# Patient Record
Sex: FEMALE | Race: OTHER
[De-identification: names, ages, dates, MRNs, and addresses within clinical notes are randomized per-mention and may not be internally consistent; named-entity substitution may affect disease eponyms.]

---

## 2020-08-13 ENCOUNTER — HOSPITAL ENCOUNTER (EMERGENCY)
Dept: HOSPITAL 72 - EMR | Age: 30
Discharge: HOME | End: 2020-08-13
Payer: MEDICAID

## 2020-08-13 VITALS — BODY MASS INDEX: 29.27 KG/M2 | WEIGHT: 155 LBS | HEIGHT: 61 IN

## 2020-08-13 VITALS — SYSTOLIC BLOOD PRESSURE: 125 MMHG | DIASTOLIC BLOOD PRESSURE: 82 MMHG

## 2020-08-13 VITALS — DIASTOLIC BLOOD PRESSURE: 82 MMHG | SYSTOLIC BLOOD PRESSURE: 125 MMHG

## 2020-08-13 VITALS — DIASTOLIC BLOOD PRESSURE: 81 MMHG | SYSTOLIC BLOOD PRESSURE: 129 MMHG

## 2020-08-13 DIAGNOSIS — O23.41: ICD-10-CM

## 2020-08-13 DIAGNOSIS — Z3A.10: ICD-10-CM

## 2020-08-13 DIAGNOSIS — O21.0: Primary | ICD-10-CM

## 2020-08-13 LAB
ADD MANUAL DIFF: NO
ALBUMIN SERPL-MCNC: 3.9 G/DL (ref 3.4–5)
ALBUMIN/GLOB SERPL: 0.9 {RATIO} (ref 1–2.7)
ALP SERPL-CCNC: 64 U/L (ref 46–116)
ALT SERPL-CCNC: 84 U/L (ref 12–78)
ANION GAP SERPL CALC-SCNC: 14 MMOL/L (ref 5–15)
APPEARANCE UR: (no result)
APTT PPP: YELLOW S
AST SERPL-CCNC: 51 U/L (ref 15–37)
BASOPHILS NFR BLD AUTO: 0.4 % (ref 0–2)
BILIRUB SERPL-MCNC: 0.9 MG/DL (ref 0.2–1)
BUN SERPL-MCNC: 10 MG/DL (ref 7–18)
CALCIUM SERPL-MCNC: 9.5 MG/DL (ref 8.5–10.1)
CHLORIDE SERPL-SCNC: 100 MMOL/L (ref 98–107)
CO2 SERPL-SCNC: 23 MMOL/L (ref 21–32)
CREAT SERPL-MCNC: 0.7 MG/DL (ref 0.55–1.3)
EOSINOPHIL NFR BLD AUTO: 0 % (ref 0–3)
ERYTHROCYTE [DISTWIDTH] IN BLOOD BY AUTOMATED COUNT: 11.4 % (ref 11.6–14.8)
GLOBULIN SER-MCNC: 4.2 G/DL
GLUCOSE UR STRIP-MCNC: NEGATIVE MG/DL
HCT VFR BLD CALC: 41.8 % (ref 37–47)
HGB BLD-MCNC: 14 G/DL (ref 12–16)
KETONES UR QL STRIP: (no result)
LEUKOCYTE ESTERASE UR QL STRIP: (no result)
LYMPHOCYTES NFR BLD AUTO: 24.6 % (ref 20–45)
MCV RBC AUTO: 93 FL (ref 80–99)
MONOCYTES NFR BLD AUTO: 6.9 % (ref 1–10)
NEUTROPHILS NFR BLD AUTO: 68.2 % (ref 45–75)
NITRITE UR QL STRIP: NEGATIVE
PH UR STRIP: 5 [PH] (ref 4.5–8)
PLATELET # BLD: 250 K/UL (ref 150–450)
POTASSIUM SERPL-SCNC: 3.7 MMOL/L (ref 3.5–5.1)
PROT UR QL STRIP: (no result)
RBC # BLD AUTO: 4.49 M/UL (ref 4.2–5.4)
SODIUM SERPL-SCNC: 137 MMOL/L (ref 136–145)
SP GR UR STRIP: 1.01 (ref 1–1.03)
UROBILINOGEN UR-MCNC: 1 MG/DL (ref 0–1)
WBC # BLD AUTO: 9.7 K/UL (ref 4.8–10.8)

## 2020-08-13 PROCEDURE — 84702 CHORIONIC GONADOTROPIN TEST: CPT

## 2020-08-13 PROCEDURE — 83690 ASSAY OF LIPASE: CPT

## 2020-08-13 PROCEDURE — 80053 COMPREHEN METABOLIC PANEL: CPT

## 2020-08-13 PROCEDURE — 96374 THER/PROPH/DIAG INJ IV PUSH: CPT

## 2020-08-13 PROCEDURE — 76801 OB US < 14 WKS SINGLE FETUS: CPT

## 2020-08-13 PROCEDURE — 96361 HYDRATE IV INFUSION ADD-ON: CPT

## 2020-08-13 PROCEDURE — 36415 COLL VENOUS BLD VENIPUNCTURE: CPT

## 2020-08-13 PROCEDURE — 81025 URINE PREGNANCY TEST: CPT

## 2020-08-13 PROCEDURE — 85025 COMPLETE CBC W/AUTO DIFF WBC: CPT

## 2020-08-13 PROCEDURE — 81003 URINALYSIS AUTO W/O SCOPE: CPT

## 2020-08-13 PROCEDURE — 99284 EMERGENCY DEPT VISIT MOD MDM: CPT

## 2020-08-13 NOTE — DIAGNOSTIC IMAGING REPORT
Indication: Abdominal pain, pregnant patient, positive hCG

 

Technique: Transabdominal only images of the pelvis. Transvaginal images not

performed, per patient preference

 

Comparison: none

 

Findings: Lack of transvaginal imaging limits exam. Uterus measures 8.5 cm in length

by 6.8 cm AP. Within the endometrium, there is a gestational sac. This demonstrates a

fetal pole with a crown-rump length of 3.4 cm, corresponding to an estimated

gestational age of 10 weeks one day. There is positive fetal heart activity, fetal

heart rate 160 bpm. The cervix is closed. No subchorionic hemorrhage. Neither ovary

could be identified. No free cul-de-sac fluid

 

Impression: Limited exam, due to lack of transvaginal images.

 

10 week 1 day, by crown-rump length measurement, single live intrauterine pregnancy.

No unusual features

 

Note nonvisualization of the ovaries

## 2020-08-13 NOTE — EMERGENCY ROOM REPORT
History of Present Illness


General


Chief Complaint:  Nausea


Source:  Patient





Present Illness


HPI


30-year-old female G2, P1, presents with nausea vomiting currently in her first 

trimester no abdominal pain, she states she feels very nauseous has been taking 

Reglan daily, aggravated by her pregnancy, no alleviating factors, severity is 

moderate, ongoing x 5 weeks. No f/c, dysuria, abdominal pain, diarrhea, cp or 

sob


Allergies:  


Coded Allergies:  


     No Known Allergies (Unverified , 20)





COVID-19 Screening


Contact w/high risk pt:  No


Experienced COVID-19 symptoms?:  No


COVID-19 Testing performed PTA:  No





Patient History


Past Medical History:  see triage record


Pregnant Now:  Yes - 11 weeks


:  2


Para:  1


Reviewed Nursing Documentation:  PMH: Agreed; PSxH: Agreed





Nursing Documentation-PMH


Past Medical History:  No Stated History





Review of Systems


All Other Systems:  negative except mentioned in HPI





Physical Exam





Vital Signs








  Date Time  Temp Pulse Resp B/P (MAP) Pulse Ox O2 Delivery O2 Flow Rate FiO2


 


20 10:24 98.2 102 18 129/81 (97) 97 Room Air  








Sp02 EP Interpretation:  reviewed, normal


General Appearance:  well appearing, no apparent distress, alert


Head:  normocephalic, atraumatic


Eyes:  bilateral eye PERRL, bilateral eye EOMI


ENT:  uvula midline, dry mucus membranes


Neck:  supple, thyroid normal, supple/symm/no masses


Respiratory:  lungs clear, no respiratory distress, no retraction, no accessory 

muscle use


Cardiovascular #1:  normal peripheral pulses, regular rate, rhythm, no edema, 

no gallop, no murmur


Gastrointestinal:  non tender, soft, no guarding, no rebound


Musculoskeletal:  normal inspection


Neurologic:  alert, oriented x3


Psychiatric:  mood/affect normal


Skin:  no rash, warm/dry





Medical Decision Making


Diagnostic Impression:  


 Primary Impression:  


 Hyperemesis gravidarum


 Additional Impression:  


 UTI (urinary tract infection)


 Qualified Codes:  N30.00 - Acute cystitis without hematuria


ER Course


30-year-old female G2, P1, 10 weeks of pregnancy presents with acute nausea and 

vomiting, most consistent with hyperemesis gravidarum, patient with ketones in 

the urine, patient with possible bacteria, will start her on Keflex and, 

patient felt better with fluid rehydration 1 L, patient is tolerating p.o.


Disposition home with return precautions


Abdomen remains soft nontender





Laboratory Tests








Test


  8/13/20


10:40


 


White Blood Count


  9.7 K/UL


(4.8-10.8)


 


Red Blood Count


  4.49 M/UL


(4.20-5.40)


 


Hemoglobin


  14.0 G/DL


(12.0-16.0)


 


Hematocrit


  41.8 %


(37.0-47.0)


 


Mean Corpuscular Volume 93 FL (80-99)  


 


Mean Corpuscular Hemoglobin


  31.2 PG


(27.0-31.0)  H


 


Mean Corpuscular Hemoglobin


Concent 33.5 G/DL


(32.0-36.0)


 


Red Cell Distribution Width


  11.4 %


(11.6-14.8)  L


 


Platelet Count


  250 K/UL


(150-450)


 


Mean Platelet Volume


  6.4 FL


(6.5-10.1)  L


 


Neutrophils (%) (Auto)


  68.2 %


(45.0-75.0)


 


Lymphocytes (%) (Auto)


  24.6 %


(20.0-45.0)


 


Monocytes (%) (Auto)


  6.9 %


(1.0-10.0)


 


Eosinophils (%) (Auto)


  0.0 %


(0.0-3.0)


 


Basophils (%) (Auto)


  0.4 %


(0.0-2.0)


 


Urine Color Yellow  


 


Urine Appearance Cloudy  


 


Urine pH 5 (4.5-8.0)  


 


Urine Specific Gravity


  1.015


(1.005-1.035)


 


Urine Protein


  1+ (NEGATIVE)


H


 


Urine Glucose (UA)


  Negative


(NEGATIVE)


 


Urine Ketones


  4+ (NEGATIVE)


H


 


Urine Blood


  4+ (NEGATIVE)


H


 


Urine Nitrite


  Negative


(NEGATIVE)


 


Urine Bilirubin


  Negative


(NEGATIVE)


 


Urine Urobilinogen


  1 MG/DL


(0.0-1.0)  H


 


Urine Leukocyte Esterase


  1+ (NEGATIVE)


H


 


Urine RBC


  2-4 /HPF (0 -


2)  H


 


Urine WBC


  2-4 /HPF (0 -


2)


 


Urine Squamous Epithelial


Cells Moderate /LPF


(NONE/OCC)  H


 


Urine Bacteria


  Few /HPF


(NONE)


 


Urine HCG, Qualitative


  Positive


(NEGATIVE)


 


Sodium Level


  137 MMOL/L


(136-145)


 


Potassium Level


  3.7 MMOL/L


(3.5-5.1)


 


Chloride Level


  100 MMOL/L


()


 


Carbon Dioxide Level


  23 MMOL/L


(21-32)


 


Anion Gap


  14 mmol/L


(5-15)


 


Blood Urea Nitrogen


  10 mg/dL


(7-18)


 


Creatinine


  0.7 MG/DL


(0.55-1.30)


 


Estimated Glomerular


Filtration Rate > 60 mL/min


(>60)


 


Glucose Level


  95 MG/DL


()


 


Calcium Level


  9.5 MG/DL


(8.5-10.1)


 


Total Bilirubin


  0.9 MG/DL


(0.2-1.0)


 


Aspartate Amino Transferase


(AST) 51 U/L (15-37)


H


 


Alanine Aminotransferase (ALT)


  84 U/L (12-78)


H


 


Alkaline Phosphatase


  64 U/L


()


 


Total Protein


  8.1 G/DL


(6.4-8.2)


 


Albumin


  3.9 G/DL


(3.4-5.0)


 


Globulin 4.2 g/dL  


 


Albumin/Globulin Ratio


  0.9 (1.0-2.7)


L


 


Lipase


  112 U/L


()


 


Human Chorionic Gonadotropin,


Quant 79096 mIU/mL


(1-6)  H








CT/MRI/US Diagnostic Results


CT/MRI/US Diagnostic Results :  


   Impression


Procedure: US OB 1st Trimester Gestation


Indication: Abdominal pain, pregnant patient, positive hCG


 


Technique: Transabdominal only images of the pelvis. Transvaginal images not


performed, per patient preference


 


Comparison: none


 


Findings: Lack of transvaginal imaging limits exam. Uterus measures 8.5 cm in 

length


by 6.8 cm AP. Within the endometrium, there is a gestational sac. This 

demonstrates a


fetal pole with a crown-rump length of 3.4 cm, corresponding to an estimated


gestational age of 10 weeks one day. There is positive fetal heart activity, 

fetal


heart rate 160 bpm. The cervix is closed. No subchorionic hemorrhage. Neither 

ovary


could be identified. No free cul-de-sac fluid


 


Impression: Limited exam, due to lack of transvaginal images.


 


10 week 1 day, by crown-rump length measurement, single live intrauterine 

pregnancy.


No unusual features


 


Note nonvisualization of the ovaries


 


 














Dictated By: Abelardo Ayon MD   


Electronically Signed By: Abelardo Ayon MD   


Signed Date/Time 20 3634   








CC: Maicol Raza MD





Last Vital Signs








  Date Time  Temp Pulse Resp B/P (MAP) Pulse Ox O2 Delivery O2 Flow Rate FiO2


 


20 10:31 98.2  18 129/81 97 Room Air  


 


20 10:24  102      








Disposition:  HOME, SELF-CARE


Condition:  Stable


Scripts


Cephalexin* (KEFLEX*) 500 Mg Tablet


500 MG ORAL EVERY 6 HOURS, #28 CAP


   Prov: Maicol Raza MD         20 


Doxylamine Succinate/Vit B6 (Doxylamine-Pyridoxine 10-10 mg) 1 Each Tablet.dr


1 EACH PO Q6HR PRN for Nausea & Vomiting, #30 TAB


   Prov: Maicol Raza MD         20


Referrals:  


NOT CHOSEN [facility]/MD,REFERRING (PCP)











[facility]





[facility]


Patient Instructions:  Eating Plan for Hyperemesis Gravidarum, Hyperemesis 

Gravidarum, Urinary Tract Infection, Easy-to-Read





Additional Instructions:  


The patient was provided with discharge instructions, notified to follow-up 

with a primary care doctor and or specialist in the next 24-48 hours, and to 

return to the ED if they have worsening of their symptoms. 





Please note that this report is being documented using DRAGON technology.


This can lead to erroneous entry secondary to incorrect interpretation by the 

dictating instrument.











Maicol Raza MD Aug 13, 2020 12:43